# Patient Record
(demographics unavailable — no encounter records)

---

## 2025-03-31 NOTE — HISTORY OF PRESENT ILLNESS
[FreeTextEntry6] : Patient has URI signs and symptoms.  He is coughing.  There is no fever.  He has been sick for 1 day.

## 2025-03-31 NOTE — PHYSICAL EXAM
[NL] : warm, clear [FreeTextEntry3] : TMs look good bilaterally [FreeTextEntry7] : Lungs are clear bilaterally